# Patient Record
Sex: FEMALE | Race: BLACK OR AFRICAN AMERICAN | NOT HISPANIC OR LATINO
[De-identification: names, ages, dates, MRNs, and addresses within clinical notes are randomized per-mention and may not be internally consistent; named-entity substitution may affect disease eponyms.]

---

## 2018-11-01 PROBLEM — Z00.00 ENCOUNTER FOR PREVENTIVE HEALTH EXAMINATION: Status: ACTIVE | Noted: 2018-11-01

## 2018-11-06 ENCOUNTER — FORM ENCOUNTER (OUTPATIENT)
Age: 53
End: 2018-11-06

## 2018-11-07 ENCOUNTER — OUTPATIENT (OUTPATIENT)
Dept: OUTPATIENT SERVICES | Facility: HOSPITAL | Age: 53
LOS: 1 days | End: 2018-11-07
Payer: COMMERCIAL

## 2018-11-07 ENCOUNTER — APPOINTMENT (OUTPATIENT)
Dept: ORTHOPEDIC SURGERY | Facility: CLINIC | Age: 53
End: 2018-11-07
Payer: COMMERCIAL

## 2018-11-07 VITALS
WEIGHT: 205 LBS | OXYGEN SATURATION: 98 % | HEIGHT: 68.5 IN | DIASTOLIC BLOOD PRESSURE: 76 MMHG | SYSTOLIC BLOOD PRESSURE: 120 MMHG | BODY MASS INDEX: 30.71 KG/M2 | HEART RATE: 80 BPM

## 2018-11-07 DIAGNOSIS — Z83.3 FAMILY HISTORY OF DIABETES MELLITUS: ICD-10-CM

## 2018-11-07 DIAGNOSIS — Z82.49 FAMILY HISTORY OF ISCHEMIC HEART DISEASE AND OTHER DISEASES OF THE CIRCULATORY SYSTEM: ICD-10-CM

## 2018-11-07 DIAGNOSIS — M17.12 UNILATERAL PRIMARY OSTEOARTHRITIS, LEFT KNEE: ICD-10-CM

## 2018-11-07 DIAGNOSIS — Z78.9 OTHER SPECIFIED HEALTH STATUS: ICD-10-CM

## 2018-11-07 PROCEDURE — 73562 X-RAY EXAM OF KNEE 3: CPT

## 2018-11-07 PROCEDURE — 99203 OFFICE O/P NEW LOW 30 MIN: CPT

## 2018-11-07 PROCEDURE — 73562 X-RAY EXAM OF KNEE 3: CPT | Mod: 26,50

## 2022-03-12 ENCOUNTER — NON-APPOINTMENT (OUTPATIENT)
Age: 57
End: 2022-03-12

## 2022-05-04 ENCOUNTER — NON-APPOINTMENT (OUTPATIENT)
Age: 57
End: 2022-05-04

## 2022-05-05 ENCOUNTER — APPOINTMENT (OUTPATIENT)
Dept: GYNECOLOGIC ONCOLOGY | Facility: CLINIC | Age: 57
End: 2022-05-05

## 2022-06-23 ENCOUNTER — NON-APPOINTMENT (OUTPATIENT)
Age: 57
End: 2022-06-23

## 2022-06-23 ENCOUNTER — APPOINTMENT (OUTPATIENT)
Dept: GYNECOLOGIC ONCOLOGY | Facility: CLINIC | Age: 57
End: 2022-06-23
Payer: COMMERCIAL

## 2022-06-23 VITALS
DIASTOLIC BLOOD PRESSURE: 85 MMHG | SYSTOLIC BLOOD PRESSURE: 128 MMHG | HEIGHT: 68.5 IN | BODY MASS INDEX: 35.36 KG/M2 | HEART RATE: 84 BPM | WEIGHT: 236 LBS | TEMPERATURE: 97.4 F | OXYGEN SATURATION: 99 %

## 2022-06-23 DIAGNOSIS — Z01.419 ENCOUNTER FOR GYNECOLOGICAL EXAMINATION (GENERAL) (ROUTINE) W/OUT ABNORMAL FINDINGS: ICD-10-CM

## 2022-06-23 PROCEDURE — 99386 PREV VISIT NEW AGE 40-64: CPT

## 2022-06-23 NOTE — CHIEF COMPLAINT
[FreeTextEntry1] : 57 y/o presents to establish new GYN care. Pt reports she has gained 50lbs due to the pandemic and ACL tear and total knee replacement but she is healed and ready to get back in the gym!\par \par Pt is on estrogel and is very happy on it. \par \par She is c/o yeast infection and prefers to do pap smear in 2 weeks. \par \par LMP:55 , denies any PMB \par OBHX:   x 3\par GYNHX: denies hx of ovarian cysts/fibroids or abnormal pap smears. \par \par PMHX: denies\par SX: total knee replacement and breast reduction 8 years ago\par \par MED: Denies\par ALL: NKDA\par \par SOCIAL: working as  in [employer], denies any toxic habits\par FAMHX: denies fmhx of cancer\par \par Health Maintenance\par Last pap: 2 years ago- normal as per patient \par Last mammogram: Dec 2019\par Last colonoscopy: 2021- repeat in 10 years\par

## 2022-06-23 NOTE — ASSESSMENT
[FreeTextEntry1] : -Patient doing well \par -Exam c/w Candida vaginitis extending to buttocks. Will prescribe diflucan and lotrimin\par -Mammogram referrla\par -Follow up in 2 weeks for pap smear

## 2022-06-24 ENCOUNTER — TRANSCRIPTION ENCOUNTER (OUTPATIENT)
Age: 57
End: 2022-06-24

## 2022-06-24 LAB
C TRACH RRNA SPEC QL NAA+PROBE: NOT DETECTED
N GONORRHOEA RRNA SPEC QL NAA+PROBE: NOT DETECTED
SOURCE AMPLIFICATION: NORMAL

## 2022-06-27 ENCOUNTER — TRANSCRIPTION ENCOUNTER (OUTPATIENT)
Age: 57
End: 2022-06-27

## 2022-07-20 ENCOUNTER — NON-APPOINTMENT (OUTPATIENT)
Age: 57
End: 2022-07-20

## 2022-07-21 ENCOUNTER — APPOINTMENT (OUTPATIENT)
Dept: GYNECOLOGIC ONCOLOGY | Facility: CLINIC | Age: 57
End: 2022-07-21

## 2022-07-21 VITALS
OXYGEN SATURATION: 98 % | WEIGHT: 236 LBS | DIASTOLIC BLOOD PRESSURE: 78 MMHG | RESPIRATION RATE: 18 BRPM | HEIGHT: 68.5 IN | BODY MASS INDEX: 35.36 KG/M2 | SYSTOLIC BLOOD PRESSURE: 131 MMHG | TEMPERATURE: 97.2 F | HEART RATE: 80 BPM

## 2022-07-21 DIAGNOSIS — Z98.890 OTHER SPECIFIED POSTPROCEDURAL STATES: ICD-10-CM

## 2022-07-21 DIAGNOSIS — Z12.4 ENCOUNTER FOR SCREENING FOR MALIGNANT NEOPLASM OF CERVIX: ICD-10-CM

## 2022-07-21 DIAGNOSIS — R10.2 PELVIC AND PERINEAL PAIN: ICD-10-CM

## 2022-07-21 PROCEDURE — 99396 PREV VISIT EST AGE 40-64: CPT

## 2022-07-21 NOTE — CHIEF COMPLAINT
[FreeTextEntry1] : 55 y/o presents to establish new GYN care. Pt reports she has gained 50lbs due to the pandemic and ACL tear and total knee replacement but she is healed and ready to get back in the gym!\par \par Pt is on estrogel and is very happy on it. \par \par She is c/o yeast infection and prefers to do pap smear in 2 weeks. \par \par LMP:55 , denies any PMB \par OBHX:   x 3\par GYNHX: denies hx of ovarian cysts/fibroids or abnormal pap smears. \par \par PMHX: denies\par SX: total knee replacement and breast reduction 8 years ago\par \par MED: Denies\par ALL: NKDA\par \par SOCIAL: working as  in [employer], denies any toxic habits\par FAMHX: denies fmhx of cancer\par \par Health Maintenance\par Last pap: 2 years ago- normal as per patient \par Last mammogram: Dec 2019\par Last colonoscopy: 2021- repeat in 10 years\par

## 2022-07-21 NOTE — PHYSICAL EXAM
[Present] : CVAT: present [Abnormal] : External genitalia: Abnormal [Normal] : Bimanual Exam: Normal [de-identified] : vulva c/w candida extending to buttocks [de-identified] : discharge c/w candida

## 2022-07-21 NOTE — REASON FOR VISIT
[FreeTextEntry1] : Pt returns today for pap smear. She reports yeast infection is still present despite using diflucan and lotrimin. Additionally, pt c/o 2 months of pelvic cramping similar to menses, but denies any PMPB\par \par Health Maintenance\par Mammogram- 7/2022- additional imaging needed

## 2022-07-22 LAB — HPV HIGH+LOW RISK DNA PNL CVX: NOT DETECTED

## 2022-07-27 ENCOUNTER — TRANSCRIPTION ENCOUNTER (OUTPATIENT)
Age: 57
End: 2022-07-27

## 2022-07-27 LAB — CYTOLOGY CVX/VAG DOC THIN PREP: NORMAL

## 2022-08-12 ENCOUNTER — NON-APPOINTMENT (OUTPATIENT)
Age: 57
End: 2022-08-12

## 2023-01-26 ENCOUNTER — APPOINTMENT (OUTPATIENT)
Dept: GYNECOLOGIC ONCOLOGY | Facility: CLINIC | Age: 58
End: 2023-01-26
Payer: COMMERCIAL

## 2023-01-26 ENCOUNTER — NON-APPOINTMENT (OUTPATIENT)
Age: 58
End: 2023-01-26

## 2023-01-26 VITALS
SYSTOLIC BLOOD PRESSURE: 137 MMHG | HEIGHT: 68.5 IN | OXYGEN SATURATION: 97 % | TEMPERATURE: 97.4 F | DIASTOLIC BLOOD PRESSURE: 95 MMHG | HEART RATE: 88 BPM

## 2023-01-26 DIAGNOSIS — R92.8 OTHER ABNORMAL AND INCONCLUSIVE FINDINGS ON DIAGNOSTIC IMAGING OF BREAST: ICD-10-CM

## 2023-01-26 PROCEDURE — 58100 BIOPSY OF UTERUS LINING: CPT

## 2023-01-26 PROCEDURE — 99396 PREV VISIT EST AGE 40-64: CPT | Mod: 25

## 2023-01-26 NOTE — PROCEDURE
[Endometrial Biopsy] : an endometrial biopsy [FreeTextEntry1] : pipelle introduced up to 6.5cm , 3 passes performed with minimal tissue

## 2023-01-26 NOTE — REASON FOR VISIT
[FreeTextEntry1] : Patient presents today c/o PMB x 1 week. She reports she hasn’t seen her menses in 3 years. \par \par Health Maintenance\par Mammogram- 7/2022- additional imaging needed

## 2023-01-26 NOTE — PHYSICAL EXAM
[Present] : CVAT: present [Abnormal] : External genitalia: Abnormal [Normal] : Bimanual Exam: Normal [de-identified] : vulva c/w candida extending to buttocks [de-identified] : discharge c/w candida

## 2023-01-26 NOTE — CHIEF COMPLAINT
[FreeTextEntry1] : 57 y/o presents to establish new GYN care. Pt reports she has gained 50lbs due to the pandemic and ACL tear and total knee replacement but she is healed and ready to get back in the gym!\par \par Pt is on estrogel and is very happy on it. \par \par She is c/o yeast infection and prefers to do pap smear in 2 weeks. \par \par LMP:55 , denies any PMB \par OBHX:   x 3\par GYNHX: denies hx of ovarian cysts/fibroids or abnormal pap smears. \par \par PMHX: denies\par SX: total knee replacement and breast reduction 8 years ago\par \par MED: Denies\par ALL: NKDA\par \par SOCIAL: working as  in [employer], denies any toxic habits\par FAMHX: denies fmhx of cancer\par \par Health Maintenance\par Last pap: 2 years ago- normal as per patient \par Last mammogram: Dec 2019\par Last colonoscopy: 2021- repeat in 10 years\par

## 2023-01-27 DIAGNOSIS — B37.31 ACUTE CANDIDIASIS OF VULVA AND VAGINA: ICD-10-CM

## 2023-02-17 ENCOUNTER — APPOINTMENT (OUTPATIENT)
Dept: GYNECOLOGIC ONCOLOGY | Facility: CLINIC | Age: 58
End: 2023-02-17

## 2023-02-17 ENCOUNTER — APPOINTMENT (OUTPATIENT)
Dept: GYNECOLOGIC ONCOLOGY | Facility: CLINIC | Age: 58
End: 2023-02-17
Payer: COMMERCIAL

## 2023-02-17 VITALS
TEMPERATURE: 97.6 F | SYSTOLIC BLOOD PRESSURE: 146 MMHG | BODY MASS INDEX: 32.96 KG/M2 | HEIGHT: 68.5 IN | DIASTOLIC BLOOD PRESSURE: 75 MMHG | OXYGEN SATURATION: 99 % | HEART RATE: 85 BPM | WEIGHT: 220 LBS | RESPIRATION RATE: 18 BRPM

## 2023-02-17 DIAGNOSIS — D25.9 LEIOMYOMA OF UTERUS, UNSPECIFIED: ICD-10-CM

## 2023-02-17 PROCEDURE — 99215 OFFICE O/P EST HI 40 MIN: CPT

## 2023-02-17 NOTE — HISTORY OF PRESENT ILLNESS
[FreeTextEntry1] : Problem List\par 1. PMB\par \par Previous Therapy\par 1. Pelvic US 1/2023\par     a) endometrium 0.4cm\par

## 2023-02-17 NOTE — CHIEF COMPLAINT
[FreeTextEntry1] : 57 y/o presents to establish new GYN care. Pt reports she has gained 50lbs due to the pandemic and ACL tear and total knee replacement but she is healed and ready to get back in the gym!\par \par Pt is on estrogel and is very happy on it. \par \par She is c/o yeast infection and prefers to do pap smear in 2 weeks. \par \par LMP:55 , denies any PMB \par OBHX:   x 3\par GYNHX: denies hx of ovarian cysts/fibroids or abnormal pap smears. \par \par PMHX: denies\par SX: total knee replacement and breast reduction 8 years ago\par \par MED: Denies\par ALL: NKDA\par \par SOCIAL: working as  in [employer], denies any toxic habits\par FAMHX: denies fmhx of cancer\par \par Health Maintenance\par Last pap: 2 years ago- normal as per patient \par Last mammogram: Dec 2019\par Last colonoscopy: 2021- repeat in 10 years\par

## 2023-02-17 NOTE — PHYSICAL EXAM
[Present] : CVAT: present [Normal] : Bimanual Exam: Normal [Fully active, able to carry on all pre-disease performance without restriction] : Status 0 - Fully active, able to carry on all pre-disease performance without restriction [de-identified] : vulva c/w candida extending to buttocks now improving

## 2023-02-17 NOTE — PROCEDURE
[Endometrial Biopsy] : an endometrial biopsy [Verbal Consent] : verbal consent was obtained prior to the procedure and is detailed in the patient's record [FreeTextEntry1] : pipelle introduced up to 6.5cm , 3 passes performed with minimal tissue

## 2023-02-17 NOTE — REASON FOR VISIT
[FreeTextEntry1] : Patient presents today to repeat EMB. Additionally she states she had another episode of PMB. \par \par LMP:55 , denies any PMB \par OBHX:  x 3\par GYNHX: denies hx of ovarian cysts/fibroids or abnormal pap smears. \par \par PMHX: denies\par SX: total knee replacement and breast reduction 8 years ago\par \par MED: Denies\par ALL: NKDA\par \par SOCIAL: working as  in [employer] denies any toxic habits\par FAMHX: denies fmhx of cancer\par \par Health Maintenance\par Mammogram-2023- 6 month follow up needed- 2023 patient is aware\par Last pap- 2022- negative, HPV negative \par Last colonoscopy - 2021- repeat in 10 years

## 2023-02-17 NOTE — ASSESSMENT
[FreeTextEntry1] : I discussed with the patient with the aid of diagrams, reviewed the findings on history and physical examination, and reviewed the imaging studies in detail. She has PMB with a thin endometrial lining.\par \par Benign, pre-malignant (such as atypical hyperplasia) and malignant causes of these findings discussed. The risk of endometrial cancer in cases of a thin endometrial stripe is less than 1%. Patient shared that she would prefer more thorough evaluation and go forward with D&C. \par \par Complications that include, but are not limited to: bleeding, infection and uterine perforation discussed. In the case of uterine perforation, diagnostic laparoscopy may be indicated. Cervical stenosis and possible inability to enter the uterine cavity was also discussed. I have also provided her with the diagrams. \par \par Surgical scheduling was discussed and instructions for optimization prior to surgery were given. NPO after midnight.  No aspirin or NSAID products for 1 week prior. Instructions for misoprostol 48 hours prior to surgery given.  A copy of the above diagrams was given to the patient. \par \par [] Medical clearance\par [] COVID test pre-op\par [] No Miso- patient has allergy\par [] D&C, hysteroscopy @ [facility] in March

## 2023-02-21 ENCOUNTER — NON-APPOINTMENT (OUTPATIENT)
Age: 58
End: 2023-02-21

## 2023-02-21 LAB
CORE LAB BIOPSY: NORMAL
FSH SERPL-MCNC: 68.5 IU/L

## 2023-03-05 ENCOUNTER — RESULT CHARGE (OUTPATIENT)
Age: 58
End: 2023-03-05

## 2023-03-06 ENCOUNTER — APPOINTMENT (OUTPATIENT)
Dept: INTERNAL MEDICINE | Facility: CLINIC | Age: 58
End: 2023-03-06
Payer: COMMERCIAL

## 2023-03-06 VITALS
DIASTOLIC BLOOD PRESSURE: 82 MMHG | HEART RATE: 108 BPM | OXYGEN SATURATION: 100 % | WEIGHT: 236 LBS | TEMPERATURE: 98 F | SYSTOLIC BLOOD PRESSURE: 121 MMHG | HEIGHT: 68.5 IN | BODY MASS INDEX: 35.36 KG/M2

## 2023-03-06 DIAGNOSIS — N95.0 POSTMENOPAUSAL BLEEDING: ICD-10-CM

## 2023-03-06 DIAGNOSIS — Z01.818 ENCOUNTER FOR OTHER PREPROCEDURAL EXAMINATION: ICD-10-CM

## 2023-03-06 PROCEDURE — 93000 ELECTROCARDIOGRAM COMPLETE: CPT

## 2023-03-06 PROCEDURE — 99214 OFFICE O/P EST MOD 30 MIN: CPT | Mod: 25,GC

## 2023-03-06 PROCEDURE — 36415 COLL VENOUS BLD VENIPUNCTURE: CPT

## 2023-03-07 LAB
ALBUMIN SERPL ELPH-MCNC: 4.4 G/DL
ALP BLD-CCNC: 65 U/L
ALT SERPL-CCNC: 15 U/L
ANION GAP SERPL CALC-SCNC: 14 MMOL/L
APTT BLD: 29.6 SEC
AST SERPL-CCNC: 19 U/L
BASOPHILS # BLD AUTO: 0.03 K/UL
BASOPHILS NFR BLD AUTO: 0.6 %
BILIRUB SERPL-MCNC: 0.3 MG/DL
BUN SERPL-MCNC: 13 MG/DL
CALCIUM SERPL-MCNC: 9.8 MG/DL
CHLORIDE SERPL-SCNC: 101 MMOL/L
CO2 SERPL-SCNC: 24 MMOL/L
CREAT SERPL-MCNC: 0.63 MG/DL
EGFR: 105 ML/MIN/1.73M2
EOSINOPHIL # BLD AUTO: 0.07 K/UL
EOSINOPHIL NFR BLD AUTO: 1.3 %
ESTIMATED AVERAGE GLUCOSE: 123 MG/DL
GLUCOSE SERPL-MCNC: 97 MG/DL
HBA1C MFR BLD HPLC: 5.9 %
HCT VFR BLD CALC: 40.2 %
HGB BLD-MCNC: 12 G/DL
IMM GRANULOCYTES NFR BLD AUTO: 0.2 %
INR PPP: 1.02 RATIO
LYMPHOCYTES # BLD AUTO: 1.8 K/UL
LYMPHOCYTES NFR BLD AUTO: 33.1 %
MAN DIFF?: NORMAL
MCHC RBC-ENTMCNC: 27.7 PG
MCHC RBC-ENTMCNC: 29.9 GM/DL
MCV RBC AUTO: 92.8 FL
MONOCYTES # BLD AUTO: 0.64 K/UL
MONOCYTES NFR BLD AUTO: 11.8 %
NEUTROPHILS # BLD AUTO: 2.89 K/UL
NEUTROPHILS NFR BLD AUTO: 53 %
PLATELET # BLD AUTO: 188 K/UL
POTASSIUM SERPL-SCNC: 4.3 MMOL/L
PROT SERPL-MCNC: 7.1 G/DL
PT BLD: 11.9 SEC
RBC # BLD: 4.33 M/UL
RBC # FLD: 13.2 %
SODIUM SERPL-SCNC: 139 MMOL/L
WBC # FLD AUTO: 5.44 K/UL

## 2023-03-17 NOTE — ASU PATIENT PROFILE, ADULT - FALL HARM RISK - RISK INTERVENTIONS

## 2023-03-17 NOTE — ASU PATIENT PROFILE, ADULT - ABLE TO REACH PT
Left voicemail instruction and time. Patient instructed to arrive at 09:00am. No solid food/dairy/candy/gum after midnight Sunday, water before 08:00am Monday; Patient to come with photo ID/insurance card; dress in comfortable clothes; no jewelries/valuables/contact lens; no smoking/alcohol drinking/recreational drug use Sunday;  escort must have photo ID, address and call back number given./no

## 2023-03-17 NOTE — ASU PATIENT PROFILE, ADULT - NSICDXPASTSURGICALHX_GEN_ALL_CORE_FT
PAST SURGICAL HISTORY:  History of mastopexy      PAST SURGICAL HISTORY:  History of mastopexy     S/P total knee replacement, right

## 2023-03-19 ENCOUNTER — TRANSCRIPTION ENCOUNTER (OUTPATIENT)
Age: 58
End: 2023-03-19

## 2023-03-20 ENCOUNTER — OUTPATIENT (OUTPATIENT)
Dept: OUTPATIENT SERVICES | Facility: HOSPITAL | Age: 58
LOS: 1 days | Discharge: HOME | End: 2023-03-20
Payer: COMMERCIAL

## 2023-03-20 ENCOUNTER — RESULT REVIEW (OUTPATIENT)
Age: 58
End: 2023-03-20

## 2023-03-20 ENCOUNTER — TRANSCRIPTION ENCOUNTER (OUTPATIENT)
Age: 58
End: 2023-03-20

## 2023-03-20 ENCOUNTER — APPOINTMENT (OUTPATIENT)
Dept: GYNECOLOGIC ONCOLOGY | Facility: AMBULATORY SURGERY CENTER | Age: 58
End: 2023-03-20

## 2023-03-20 VITALS
SYSTOLIC BLOOD PRESSURE: 131 MMHG | DIASTOLIC BLOOD PRESSURE: 61 MMHG | OXYGEN SATURATION: 100 % | HEART RATE: 72 BPM | RESPIRATION RATE: 15 BRPM

## 2023-03-20 VITALS
SYSTOLIC BLOOD PRESSURE: 117 MMHG | TEMPERATURE: 98 F | HEIGHT: 68 IN | DIASTOLIC BLOOD PRESSURE: 72 MMHG | OXYGEN SATURATION: 99 % | HEART RATE: 109 BPM | WEIGHT: 231.71 LBS | RESPIRATION RATE: 16 BRPM

## 2023-03-20 DIAGNOSIS — Z98.890 OTHER SPECIFIED POSTPROCEDURAL STATES: Chronic | ICD-10-CM

## 2023-03-20 DIAGNOSIS — Z96.651 PRESENCE OF RIGHT ARTIFICIAL KNEE JOINT: Chronic | ICD-10-CM

## 2023-03-20 PROCEDURE — 88305 TISSUE EXAM BY PATHOLOGIST: CPT | Mod: 26

## 2023-03-20 PROCEDURE — 58558 HYSTEROSCOPY BIOPSY: CPT

## 2023-03-20 RX ORDER — ACETAMINOPHEN 500 MG
1000 TABLET ORAL ONCE
Refills: 0 | Status: COMPLETED | OUTPATIENT
Start: 2023-03-20 | End: 2023-03-20

## 2023-03-20 RX ORDER — CETIRIZINE HYDROCHLORIDE 10 MG/1
1 TABLET ORAL
Qty: 0 | Refills: 0 | DISCHARGE

## 2023-03-20 RX ORDER — SODIUM CHLORIDE 9 MG/ML
500 INJECTION, SOLUTION INTRAVENOUS
Refills: 0 | Status: DISCONTINUED | OUTPATIENT
Start: 2023-03-20 | End: 2023-03-20

## 2023-03-20 RX ORDER — OXYCODONE HYDROCHLORIDE 5 MG/1
5 TABLET ORAL ONCE
Refills: 0 | Status: DISCONTINUED | OUTPATIENT
Start: 2023-03-20 | End: 2023-03-20

## 2023-03-20 RX ORDER — FENTANYL CITRATE 50 UG/ML
25 INJECTION INTRAVENOUS
Refills: 0 | Status: DISCONTINUED | OUTPATIENT
Start: 2023-03-20 | End: 2023-03-20

## 2023-03-20 RX ADMIN — FENTANYL CITRATE 25 MICROGRAM(S): 50 INJECTION INTRAVENOUS at 13:25

## 2023-03-20 RX ADMIN — Medication 500 MILLIGRAM(S): at 09:55

## 2023-03-20 RX ADMIN — FENTANYL CITRATE 25 MICROGRAM(S): 50 INJECTION INTRAVENOUS at 13:05

## 2023-03-20 RX ADMIN — FENTANYL CITRATE 25 MICROGRAM(S): 50 INJECTION INTRAVENOUS at 13:10

## 2023-03-20 NOTE — ASU PREOP CHECKLIST - 2.
patient has 1 white stud earring right ear, refused to be removed, risks explained.  Kourtney OR RN informed.

## 2023-03-21 LAB — SURGICAL PATHOLOGY STUDY: SIGNIFICANT CHANGE UP

## 2023-03-22 ENCOUNTER — NON-APPOINTMENT (OUTPATIENT)
Age: 58
End: 2023-03-22

## 2023-04-14 PROBLEM — R10.2 PELVIC AND PERINEAL PAIN: Chronic | Status: ACTIVE | Noted: 2023-03-17

## 2023-05-03 NOTE — HISTORY OF PRESENT ILLNESS
[FreeTextEntry1] : Problem List\par 1. PMB\par \par Previous Therapy\par 1. Pelvic US 1/2023\par     a) endometrium 0.4cm\par 2. Dilation  and curettage, hysteroscopy 3/20/23\par     a) Endometrium, curettage:\par - Strips of inactive endometrium\par - Unremarkable endocervical and squamous epithelium\par

## 2023-05-05 ENCOUNTER — APPOINTMENT (OUTPATIENT)
Dept: GYNECOLOGIC ONCOLOGY | Facility: CLINIC | Age: 58
End: 2023-05-05

## 2023-06-19 ENCOUNTER — APPOINTMENT (OUTPATIENT)
Dept: INTERNAL MEDICINE | Facility: CLINIC | Age: 58
End: 2023-06-19

## 2023-07-06 ENCOUNTER — APPOINTMENT (OUTPATIENT)
Dept: INTERNAL MEDICINE | Facility: CLINIC | Age: 58
End: 2023-07-06
Payer: COMMERCIAL

## 2023-07-06 VITALS
HEART RATE: 87 BPM | HEIGHT: 68.5 IN | DIASTOLIC BLOOD PRESSURE: 75 MMHG | OXYGEN SATURATION: 99 % | BODY MASS INDEX: 35.36 KG/M2 | WEIGHT: 236 LBS | SYSTOLIC BLOOD PRESSURE: 109 MMHG | TEMPERATURE: 97 F

## 2023-07-06 PROCEDURE — 99213 OFFICE O/P EST LOW 20 MIN: CPT | Mod: GC,25

## 2023-07-06 PROCEDURE — 99396 PREV VISIT EST AGE 40-64: CPT | Mod: 25

## 2023-07-06 PROCEDURE — 99203 OFFICE O/P NEW LOW 30 MIN: CPT | Mod: GC,25

## 2023-07-06 PROCEDURE — 99386 PREV VISIT NEW AGE 40-64: CPT | Mod: 25

## 2023-07-06 PROCEDURE — 36415 COLL VENOUS BLD VENIPUNCTURE: CPT

## 2023-07-06 RX ORDER — CLOTRIMAZOLE 10 MG/G
1 CREAM TOPICAL 3 TIMES DAILY
Qty: 1 | Refills: 1 | Status: DISCONTINUED | COMMUNITY
Start: 2023-01-27 | End: 2023-07-06

## 2023-07-06 RX ORDER — BUTENAFINE HCL 1 %
1 CREAM (GRAM) TOPICAL DAILY
Qty: 1 | Refills: 1 | Status: DISCONTINUED | COMMUNITY
Start: 2022-07-21 | End: 2023-07-06

## 2023-07-06 RX ORDER — FLUCONAZOLE 150 MG/1
150 TABLET ORAL
Qty: 2 | Refills: 0 | Status: DISCONTINUED | COMMUNITY
Start: 2022-07-21 | End: 2023-07-06

## 2023-07-06 RX ORDER — CLOTRIMAZOLE 10 MG/G
1 CREAM TOPICAL 3 TIMES DAILY
Qty: 1 | Refills: 1 | Status: DISCONTINUED | COMMUNITY
Start: 2022-06-23 | End: 2023-07-06

## 2023-07-06 RX ORDER — FLUCONAZOLE 150 MG/1
150 TABLET ORAL
Qty: 2 | Refills: 0 | Status: DISCONTINUED | COMMUNITY
Start: 2022-06-23 | End: 2023-07-06

## 2023-07-07 ENCOUNTER — NON-APPOINTMENT (OUTPATIENT)
Age: 58
End: 2023-07-07

## 2023-07-07 ENCOUNTER — TRANSCRIPTION ENCOUNTER (OUTPATIENT)
Age: 58
End: 2023-07-07

## 2023-07-10 LAB
CHOLEST SERPL-MCNC: 261 MG/DL
HDLC SERPL-MCNC: 63 MG/DL
LDLC SERPL CALC-MCNC: 177 MG/DL
NONHDLC SERPL-MCNC: 198 MG/DL
TRIGL SERPL-MCNC: 108 MG/DL

## 2023-07-10 NOTE — PLAN
[FreeTextEntry1] : #Migraines with aura\par Patient with hx of migraines since she was a teenager. Previously on botox with resolution of migraines while living in [place]. Now with monthly migraines with associated N/V, light sensitivity. \par - patient to see own neurologist, will provide referral if needed\par - continue with excedrin and coffee if needed\par - can consider sumatriptan if cardiology exam shows no evidence of CAD \par - consider neuro referral in the future\par \par #Possible TIA\par #Afib\par Patient with history of pAFib (not on medications) and without hx of known CAD.\par - cardiology referral\par - will consider starting eliquis if indicated\par - lipid panel\par \par #Depression\par Patient reports feeling depressed with low motivation. States she has tried Zoloft in the past but could not tolerate side effects. Had some success with talk therapy.\par - referral to therapist\par - consider bupropion in future if no improvement with therapy\par \par #Prediabetes\par Patient with A1c 5.9% at last visit. Attributes it to gaining weight iso being depressed and not motivated.\par - therapy as above\par - encouraged patient to make lifestyle modifications with diet and exercise, cut down on alcohol use\par - recheck A1c at next visit\par \par #HCM\par - Mammogram scheduled for this week\par - Colonoscopy normal in 2021, next due in 2031\par - Pap smear with HPV normal in 2022, next due in 2027\par - Hysteroscopy and D&C performed in Mar 2023 for AUB, pathology normal\par \par RTC in 3 months for follow up

## 2023-07-10 NOTE — END OF VISIT
[] : Resident [FreeTextEntry3] :  57 year old F presenting to establish care. Reports hx of migraines, [place] Now uses NSAIDs/caffeine for rescue treatment. Reports episode years ago of sudden vision loss in R eye while exercising at the gym. Reports "a curtain" passing across her vision, symptoms resolved within a few minutes and she completed her work out, did not seek medical attention at the time. however, she subsequently had MRI brain with PCP which was reportedly normal, will try to get us those records. Has h/o pAF, not on AC. Well appearing on exam. Although pt's isolated episode of vision loss was attributed to ocular migraine at the time, it had features concerning for amaurosis fugax. Will try to obtain MRI brain report and MRI/MRA neck if performed. Defer triptan for now given ?risk of CVA. Referred to neurology.

## 2023-07-10 NOTE — HISTORY OF PRESENT ILLNESS
[FreeTextEntry1] : establish care [de-identified] : Patient is a 58yo F with PMH of paroxysmal AFib, possible TIA (amourosis fugax) 3 years ago, and migraines with aura who presents to establish care. \par \par Patient reports feeling well overall. Has been struggling with weight gain recently which she thinks is due to lack of motivation to workout and also due to bad eating habits. Additionally, she has been drinking 2-3 glasses of wine every night and feels she may be depressed. She has previously tried Zoloft but did not tolerate due to side effects, did have some improvement in symptoms with therapy and is willing to try again. \par \par States she had an episode about 3 years ago where she had transient vision loss of the right eye. Episode occurred while she was walking on the treadmill and she felt like a "curtain pulled over and then pulled back". Does have a history of arrhythmia, was told she likely has paroxysmal AFib as she regularly has palpitations that self-resolve. Was placed on a Holter monitor in [place] but no events recorded at the time. Has never taken anticoagulation, was taking Aspirin but recently stopped due to easy bruising. No hx of cardiovascular disease. Patient believes episode at the time was thought to be 2/2 ocular migraine. Denies any further episodes of vision loss. \par \par Patient with hx of migraines with aura. States she started getting migraines initially with onset of menses as a teenager, and was being treated with Botox when she lived in [facility]. Recently, since hs moved back, she has been treating migraines with excedrin, coffee, and rest. Reports getting migraines once a month with associated nausea, vomiting, light sensitivity. \par \par HCM - Mammogram scheduled for this Saturday. Pap smear 7/2022 (HPV negative). Colonoscopy 12/2021 --> due for repeat in 2031.\par \par \par \par \par \par

## 2023-08-02 ENCOUNTER — NON-APPOINTMENT (OUTPATIENT)
Age: 58
End: 2023-08-02

## 2023-08-02 ENCOUNTER — APPOINTMENT (OUTPATIENT)
Dept: HEART AND VASCULAR | Facility: CLINIC | Age: 58
End: 2023-08-02

## 2023-08-02 VITALS
HEART RATE: 84 BPM | BODY MASS INDEX: 35.51 KG/M2 | HEIGHT: 68.5 IN | SYSTOLIC BLOOD PRESSURE: 118 MMHG | OXYGEN SATURATION: 97 % | DIASTOLIC BLOOD PRESSURE: 86 MMHG | WEIGHT: 237 LBS | TEMPERATURE: 97.9 F | RESPIRATION RATE: 16 BRPM

## 2023-08-29 ENCOUNTER — APPOINTMENT (OUTPATIENT)
Dept: HEART AND VASCULAR | Facility: CLINIC | Age: 58
End: 2023-08-29
Payer: COMMERCIAL

## 2023-08-29 ENCOUNTER — NON-APPOINTMENT (OUTPATIENT)
Age: 58
End: 2023-08-29

## 2023-08-29 VITALS
SYSTOLIC BLOOD PRESSURE: 116 MMHG | RESPIRATION RATE: 16 BRPM | WEIGHT: 238 LBS | OXYGEN SATURATION: 98 % | HEIGHT: 68.5 IN | DIASTOLIC BLOOD PRESSURE: 79 MMHG | HEART RATE: 84 BPM | BODY MASS INDEX: 35.66 KG/M2

## 2023-08-29 PROCEDURE — 99215 OFFICE O/P EST HI 40 MIN: CPT | Mod: 25

## 2023-08-29 PROCEDURE — 93000 ELECTROCARDIOGRAM COMPLETE: CPT

## 2023-08-29 NOTE — REVIEW OF SYSTEMS
[SOB] : shortness of breath [Chest Discomfort] : no chest discomfort [Lower Ext Edema] : no extremity edema [Leg Claudication] : no intermittent leg claudication [Palpitations] : palpitations [Orthopnea] : no orthopnea [PND] : no PND [Syncope] : no syncope [Negative] : Heme/Lymph

## 2023-08-29 NOTE — CARDIOLOGY SUMMARY
[de-identified] : 8/29/2023: Atrial rhythm at 90bpm. NSSTTC 8/2/2023: NSR at 72bpm, NSSTTC (TWI V1-2)

## 2023-08-29 NOTE — ASSESSMENT
[FreeTextEntry1] : 57 year old woman, w/ a PMHx of paroxysmal AFib (not on AC), TIA (amaurosis fugax, 2020), HLD, and migraine HAs, presenting for f/u of AFib.   pAFib: [id number] 3, therefore stroke risk 3.2% per year ([place] Atrial Fibrillation Cohort Study) and 4.6% risk of stroke/TIA/systemic embolism. Patient states that she feels palpitations, described as an "irregular heartbeat" that is a/w SOB and nausea occurring at least once per week. These episodes last ~10 minutes and have occurred for ~1 year. She sometimes also experiences transient L hand paresthesia ("tingling") along w/ this. Patient admits that these episodes are exacerbated by times of emotional stress. Presently, she denies CP, SOB/TOWNSEND, dizziness/LOC, PND, orthopnea, HAs, abdominal pain, and LE swelling. ECG w/o AFib and physical exam stable. - start apixaban 5mg PO BID - monitor for signs and sx of bleeding - MCTx7 days ordered today given palpitations to ensure patient is feeling pAFib and not other arrhythmia - TTE ordered today  TIA: Patient w/ hx of amaurosis fugax several years ago and again w/in the last month. Requesting Neurology referral at this time. She reports no residual deficits and neurologic exam grossly WNL. - Neurology referral made today - patient instructed to monitor signs and sx of CVA and seek emergent medical attention if they present  HLD: Lipid profile 7/7/2023 w/ , , HDL 63, . - as per d/w PMD, patient to institute lifestyle modifications (diet and exercise) prior to possibly initiating statin tx - increase aerobic exercise as tolerated to aim for approximately 30 minutes of activity 5 days a week - heart healthy diet low in sodium, sugars and saturated fats and high in fruits, vegetables and whole grains  Prediabetes: HgA1c 5.9% in 3/2023. Attributes it to gaining weight iso being depressed and not motivated. - lifestyle modifications (diet and exercise)  - weight loss counseling - increase aerobic exercise as tolerated to aim for approximately 30 minutes of activity 5 days a week - heart healthy diet low in sodium, sugars and saturated fats and high in fruits, vegetables and whole grains  Obesity: BMI >35. - lifestyle modifications (diet and exercise)  - weight loss counseling - increase aerobic exercise as tolerated to aim for approximately 30 minutes of activity 5 days a week - heart healthy diet low in sodium, sugars and saturated fats and high in fruits, vegetables and whole grains  F/u in 1 month (s/p TTE, MCT, and Neurology appointment).

## 2023-08-29 NOTE — PHYSICAL EXAM
[No Acute Distress] : no acute distress [Normal Conjunctiva] : normal conjunctiva [Normal Venous Pressure] : normal venous pressure [No Carotid Bruit] : no carotid bruit [Normal S1, S2] : normal S1, S2 [No Murmur] : no murmur [No Rub] : no rub [No Gallop] : no gallop [Clear Lung Fields] : clear lung fields [Good Air Entry] : good air entry [No Respiratory Distress] : no respiratory distress  [Soft] : abdomen soft [Non Tender] : non-tender [No Masses/organomegaly] : no masses/organomegaly [Normal Bowel Sounds] : normal bowel sounds [Normal Gait] : normal gait [No Edema] : no edema [No Cyanosis] : no cyanosis [No Clubbing] : no clubbing [No Varicosities] : no varicosities [No Rash] : no rash [No Skin Lesions] : no skin lesions [Moves all extremities] : moves all extremities [No Focal Deficits] : no focal deficits [Normal Speech] : normal speech [Alert and Oriented] : alert and oriented [Normal memory] : normal memory [de-identified] : Obese [de-identified] : RRR

## 2023-08-29 NOTE — HISTORY OF PRESENT ILLNESS
[FreeTextEntry1] : 57 year old woman, w/ a PMHx of paroxysmal AFib (not on AC), TIA (amaurosis fugax, 2020), HLD, and migraine HAs, presenting for f/u of AFib. Patient states that she feels palpitations, described as an "irregular heartbeat" that is a/w SOB and nausea occurring at least once per week. These episodes last ~10 minutes and have occurred for ~1 year. She sometimes also experiences transient L hand paresthesia ("tingling") along w/ this. Patient admits that these episodes are exacerbated by times of emotional stress. Presently, she denies CP, SOB/TOWNSEND, dizziness/LOC, PND, orthopnea, HAs, abdominal pain, and LE swelling.

## 2023-09-25 ENCOUNTER — APPOINTMENT (OUTPATIENT)
Dept: HEART AND VASCULAR | Facility: CLINIC | Age: 58
End: 2023-09-25
Payer: COMMERCIAL

## 2023-09-25 PROCEDURE — 93306 TTE W/DOPPLER COMPLETE: CPT

## 2023-09-26 ENCOUNTER — APPOINTMENT (OUTPATIENT)
Dept: HEART AND VASCULAR | Facility: CLINIC | Age: 58
End: 2023-09-26
Payer: COMMERCIAL

## 2023-09-26 VITALS
SYSTOLIC BLOOD PRESSURE: 128 MMHG | OXYGEN SATURATION: 98 % | BODY MASS INDEX: 35.66 KG/M2 | DIASTOLIC BLOOD PRESSURE: 84 MMHG | HEIGHT: 68.5 IN | HEART RATE: 80 BPM | RESPIRATION RATE: 16 BRPM | WEIGHT: 238 LBS

## 2023-09-26 DIAGNOSIS — Z86.73 PERSONAL HISTORY OF TRANSIENT ISCHEMIC ATTACK (TIA), AND CEREBRAL INFARCTION W/OUT RESIDUAL DEFICITS: ICD-10-CM

## 2023-09-26 DIAGNOSIS — I48.91 UNSPECIFIED ATRIAL FIBRILLATION: ICD-10-CM

## 2023-09-26 DIAGNOSIS — E66.9 OBESITY, UNSPECIFIED: ICD-10-CM

## 2023-09-26 DIAGNOSIS — G45.3 AMAUROSIS FUGAX: ICD-10-CM

## 2023-09-26 DIAGNOSIS — E78.5 HYPERLIPIDEMIA, UNSPECIFIED: ICD-10-CM

## 2023-09-26 PROCEDURE — 99215 OFFICE O/P EST HI 40 MIN: CPT

## 2023-09-26 RX ORDER — ATORVASTATIN CALCIUM 40 MG/1
40 TABLET, FILM COATED ORAL
Qty: 90 | Refills: 3 | Status: ACTIVE | COMMUNITY
Start: 2023-09-26 | End: 1900-01-01

## 2023-10-24 ENCOUNTER — APPOINTMENT (OUTPATIENT)
Dept: INTERNAL MEDICINE | Facility: CLINIC | Age: 58
End: 2023-10-24

## 2023-11-07 NOTE — HISTORY OF PRESENT ILLNESS
[FreeTextEntry1] : Problem List\par 1. PMB\par \par Previous Therapy\par 1. Pelvic US 1/2023\par     a) endometrium 0.4cm\par  Nasalis-Muscle-Based Myocutaneous Island Pedicle Flap Text: Using a #15 blade, an incision was made around the donor flap to the level of the nasalis muscle. Wide lateral undermining was then performed in both the subcutaneous plane above the nasalis muscle, and in a submuscular plane just above periosteum. This allowed the formation of a free nasalis muscle axial pedicle (based on the angular artery) which was still attached to the actual cutaneous flap, increasing its mobility and vascular viability. Hemostasis was obtained with pinpoint electrocoagulation. The flap was mobilized into position and the pivotal anchor points positioned and stabilized with buried interrupted sutures. Subcutaneous and dermal tissues were closed in a multilayered fashion with sutures. Tissue redundancies were excised, and the epidermal edges were apposed without significant tension and sutured with sutures.

## 2023-11-08 ENCOUNTER — APPOINTMENT (OUTPATIENT)
Dept: HEART AND VASCULAR | Facility: CLINIC | Age: 58
End: 2023-11-08
Payer: COMMERCIAL

## 2023-11-08 ENCOUNTER — NON-APPOINTMENT (OUTPATIENT)
Age: 58
End: 2023-11-08

## 2023-11-08 VITALS — HEIGHT: 68.5 IN | TEMPERATURE: 97.6 F

## 2023-11-08 VITALS — SYSTOLIC BLOOD PRESSURE: 124 MMHG | HEART RATE: 84 BPM | DIASTOLIC BLOOD PRESSURE: 66 MMHG

## 2023-11-08 DIAGNOSIS — I48.0 PAROXYSMAL ATRIAL FIBRILLATION: ICD-10-CM

## 2023-11-08 PROCEDURE — 99204 OFFICE O/P NEW MOD 45 MIN: CPT | Mod: 25

## 2023-11-08 PROCEDURE — 93000 ELECTROCARDIOGRAM COMPLETE: CPT

## 2023-11-10 PROBLEM — I48.0 PAROXYSMAL ATRIAL FIBRILLATION: Status: ACTIVE | Noted: 2023-08-29

## 2023-12-02 RX ORDER — APIXABAN 5 MG/1
5 TABLET, FILM COATED ORAL
Qty: 90 | Refills: 3 | Status: ACTIVE | COMMUNITY
Start: 2023-08-29 | End: 1900-01-01

## 2023-12-12 ENCOUNTER — APPOINTMENT (OUTPATIENT)
Dept: INTERNAL MEDICINE | Facility: CLINIC | Age: 58
End: 2023-12-12
Payer: COMMERCIAL

## 2023-12-12 VITALS
BODY MASS INDEX: 35.96 KG/M2 | DIASTOLIC BLOOD PRESSURE: 82 MMHG | RESPIRATION RATE: 16 BRPM | HEIGHT: 68.5 IN | SYSTOLIC BLOOD PRESSURE: 133 MMHG | WEIGHT: 240 LBS | OXYGEN SATURATION: 98 % | HEART RATE: 85 BPM

## 2023-12-12 DIAGNOSIS — Z12.39 ENCOUNTER FOR OTHER SCREENING FOR MALIGNANT NEOPLASM OF BREAST: ICD-10-CM

## 2023-12-12 DIAGNOSIS — G43.909 MIGRAINE, UNSPECIFIED, NOT INTRACTABLE, W/OUT STATUS MIGRAINOSUS: ICD-10-CM

## 2023-12-12 DIAGNOSIS — R00.2 PALPITATIONS: ICD-10-CM

## 2023-12-12 DIAGNOSIS — F32.A DEPRESSION, UNSPECIFIED: ICD-10-CM

## 2023-12-12 PROCEDURE — 99215 OFFICE O/P EST HI 40 MIN: CPT | Mod: 25

## 2023-12-12 PROCEDURE — 36415 COLL VENOUS BLD VENIPUNCTURE: CPT

## 2023-12-14 LAB
ESTIMATED AVERAGE GLUCOSE: 123 MG/DL
HBA1C MFR BLD HPLC: 5.9 %

## 2023-12-15 ENCOUNTER — TRANSCRIPTION ENCOUNTER (OUTPATIENT)
Age: 58
End: 2023-12-15

## 2023-12-19 ENCOUNTER — APPOINTMENT (OUTPATIENT)
Dept: HEART AND VASCULAR | Facility: CLINIC | Age: 58
End: 2023-12-19

## 2023-12-19 RX ORDER — METFORMIN HYDROCHLORIDE 500 MG/1
500 TABLET, COATED ORAL DAILY
Qty: 30 | Refills: 1 | Status: ACTIVE | COMMUNITY
Start: 2023-12-15 | End: 1900-01-01

## 2023-12-31 ENCOUNTER — APPOINTMENT (OUTPATIENT)
Dept: HEART AND VASCULAR | Facility: CLINIC | Age: 58
End: 2023-12-31
Payer: COMMERCIAL

## 2023-12-31 PROCEDURE — 93272 ECG/REVIEW INTERPRET ONLY: CPT

## 2024-01-23 ENCOUNTER — APPOINTMENT (OUTPATIENT)
Dept: INTERNAL MEDICINE | Facility: CLINIC | Age: 59
End: 2024-01-23
Payer: COMMERCIAL

## 2024-01-23 VITALS — DIASTOLIC BLOOD PRESSURE: 84 MMHG | SYSTOLIC BLOOD PRESSURE: 122 MMHG | RESPIRATION RATE: 14 BRPM | HEART RATE: 75 BPM

## 2024-01-23 VITALS — WEIGHT: 232 LBS | BODY MASS INDEX: 34.76 KG/M2

## 2024-01-23 DIAGNOSIS — K21.9 GASTRO-ESOPHAGEAL REFLUX DISEASE W/OUT ESOPHAGITIS: ICD-10-CM

## 2024-01-23 DIAGNOSIS — R73.03 PREDIABETES.: ICD-10-CM

## 2024-01-23 PROCEDURE — G2211 COMPLEX E/M VISIT ADD ON: CPT

## 2024-01-23 PROCEDURE — 99213 OFFICE O/P EST LOW 20 MIN: CPT | Mod: GC

## 2024-01-23 RX ORDER — PANTOPRAZOLE 40 MG/1
40 TABLET, DELAYED RELEASE ORAL DAILY
Qty: 30 | Refills: 1 | Status: ACTIVE | COMMUNITY
Start: 2023-12-12 | End: 1900-01-01

## 2024-01-23 NOTE — END OF VISIT
[] : Resident [FreeTextEntry3] :  58 year old F presenting for follow up. Was started on metformin at last visit for prediabetes, notes nausea and diarrhea after initiating it. Diarrhea has resolved, but she still has nausea and decreased appetite. Well appearing on exam. Weight loss of 8 lbs since starting metformin. Discussed options of holding metformin or transitioning to extended release formulation to reduce GI side effects, pt prefers to continue current regimen for now and reassess symptoms at follow up. RTC 1 month, earlier prn.

## 2024-01-23 NOTE — ASSESSMENT
[FreeTextEntry1] : #Prediabetes Hx of Prediabetes. A1c 5.9 in 12/2023. Started on Metformin 500mg qd IR. Pt endorsing nausea which has been improving and lack of appetite since beginning Metformin. Also had diarrhea initially which has since resolved. Pt with weight loss since last visit -- about 8lbs lost while on Metformin.  - Pt to continue to monitor side effects. If not improved upon follow up within 4 weeks, will consider switching to ER formulation vs discontinuing medication.  #GERD Pt with hx of chronic cough and GERD. Was started on protonix 40mg qd at last office visit and noticed improvement in her symptoms and GERD symptoms.  Pt would like refill sent to pharmacy.  - refill sent to pharmacy  RTC in 4 weeks to discuss medications and side effects

## 2024-01-23 NOTE — HISTORY OF PRESENT ILLNESS
[FreeTextEntry1] : follow up  [de-identified] :  58yo F with PMH of paroxysmal AFib on eliquis, ?TIA, pre-diabetes (A1c 5.9 in 12/2023), palpitations, migraines with aura, GERD and AUB s/p hysteroscopy and D&C who presents for follow up and medication refills. Pt started on protonix 40mg qd for her GERD symptoms one month prior at last visit. Pt was also started on Metformin last month for elevated A1c of 5.9 however she had endorsed extreme nausea and reduction in appetite since taking Metformin. Pt had diarrhea when she began taking it but it has resolved. Side effects have been reducing and patient still wanting to continue on the medication. Pt endorsing her cough has improved since starting Protonix and she feels as though her acid reflux is more controlled. Pt has cardilogy appt next week with Dr. Mckay to discuss her palpitations.

## 2024-01-23 NOTE — REVIEW OF SYSTEMS
[Nausea] : nausea [Fever] : no fever [Chills] : no chills [Night Sweats] : no night sweats [Discharge] : no discharge [Pain] : no pain [Vision Problems] : no vision problems [Itching] : no itching [Chest Pain] : no chest pain [Palpitations] : no palpitations [Orthopnea] : no orthopnea [Shortness Of Breath] : no shortness of breath [Wheezing] : no wheezing [Cough] : no cough [Abdominal Pain] : no abdominal pain [Vomiting] : no vomiting [Heartburn] : no heartburn [Dysuria] : no dysuria [Incontinence] : no incontinence [Hematuria] : no hematuria [Frequency] : no frequency [Joint Pain] : no joint pain [Joint Stiffness] : no joint stiffness [Muscle Pain] : no muscle pain [Back Pain] : no back pain [Headache] : no headache [Dizziness] : no dizziness [Fainting] : no fainting

## 2024-02-19 ENCOUNTER — RX RENEWAL (OUTPATIENT)
Age: 59
End: 2024-02-19

## 2024-02-19 RX ORDER — METOPROLOL SUCCINATE 25 MG/1
25 TABLET, EXTENDED RELEASE ORAL
Qty: 90 | Refills: 1 | Status: ACTIVE | COMMUNITY
Start: 2023-09-26 | End: 1900-01-01

## 2024-02-26 ENCOUNTER — APPOINTMENT (OUTPATIENT)
Dept: INTERNAL MEDICINE | Facility: CLINIC | Age: 59
End: 2024-02-26

## 2024-03-28 ENCOUNTER — APPOINTMENT (OUTPATIENT)
Dept: GYNECOLOGIC ONCOLOGY | Facility: CLINIC | Age: 59
End: 2024-03-28

## 2024-03-28 NOTE — REASON FOR VISIT
[FreeTextEntry1] : Follow-up  56yo here c/o abdominal pain.   LMP:53 , denies any PMB  OBHX:  x 3 GYNHX: denies hx of ovarian cysts/fibroids or abnormal pap smears.   PMHX: denies SX: total knee replacement and breast reduction 8 years ago, D&C 2023  MED: Denies ALL: NKDA  SOCIAL: working as  in [employer], denies any toxic habits FAMHX: denies fmhx of cancer  Health Maintenance Mammogram-2023- 6 month follow up needed- 2023 patient is aware Last pap- 2022- negative, HPV negative  Last colonoscopy - 2021- repeat in 10 years

## 2024-09-30 NOTE — ASSESSMENT
[facility]  [address] [place] [address]  [phone]  [email or web address]       CARDIOLOGY CONSULTATION       Date of  Admission: 9/29/2024 11:27 AM     Admission type:Emergency   Primary Care Physician:Niyah Gardner APRN - NP     Attending Provider: Wicho Echeverria MD  Cardiology Provider: No prior cardiologist    CC/REASON FOR CONSULT: Heart failure     Subjective:     Francesca Baeza is a 70 y.o. female admitted for Acute left-sided CHF (congestive heart failure) (HCC) [I50.1]  Acute congestive heart failure, unspecified heart failure type (HCC) [I50.9].    The patient was seen and examined at the bedside.  She denies any prior medical history other than recently diagnosed low sodium level.  She presented to the hospital because of shortness of breath that worsened over the nighttime.  She is a current smoker.  Patient daughter and patient sister are at the bedside.  The patient has experienced shortness of breath for the last 2 weeks.  Denies any chest pain.  Patient had also experienced leg swelling that has improved since initial presentation to the hospital.      Patient Active Problem List    Diagnosis Date Noted    Acute left-sided CHF (congestive heart failure) (HCC) 09/29/2024    Hypercholesterolemia     Hypertension     Smoker 06/24/2016    Vitamin D deficiency 11/13/2013      Niyah Gardner APRN - NP  Past Medical History:   Diagnosis Date    Hypercholesterolemia     Hypertension       Social History     Socioeconomic History    Marital status:    Tobacco Use    Smoking status: Every Day     Current packs/day: 0.50     Average packs/day: 0.5 packs/day for 44.7 years (22.4 ttl pk-yrs)     Types: Cigarettes     Start date: 1/1/1980     Passive exposure: Current    Smokeless tobacco: Never   Vaping Use    Vaping status: Never Used   Substance and Sexual Activity    Alcohol use: Yes     Alcohol/week: 6.0 standard drinks of alcohol     Types: 6 Cans  [FreeTextEntry1] : -Exam c/w Candida vaginitis extending to buttocks. Will prescribe diflucan and lotrimin\par -Mammogram and breast US given for additional imaging\par -Pelvic US for pelvic cramping\par - Will call patient with results. If everything is normal patient is to follow up in 1 year or prn.  of beer per week     Comment: drinks daiily    Drug use: Never    Sexual activity: Defer     Social Determinants of Health     Financial Resource Strain: Low Risk  (9/4/2024)    Overall Financial Resource Strain (CARDIA)     Difficulty of Paying Living Expenses: Not very hard   Food Insecurity: No Food Insecurity (9/29/2024)    Hunger Vital Sign     Worried About Running Out of Food in the Last Year: Never true     Ran Out of Food in the Last Year: Never true   Transportation Needs: No Transportation Needs (9/29/2024)    PRAPARE - Transportation     Lack of Transportation (Medical): No     Lack of Transportation (Non-Medical): No   Physical Activity: Unknown (3/26/2024)    Exercise Vital Sign     Days of Exercise per Week: 0 days   Recent Concern: Physical Activity - Inactive (3/26/2024)    Exercise Vital Sign     Days of Exercise per Week: 0 days     Minutes of Exercise per Session: 0 min   Stress: No Stress Concern Present (8/30/2023)    [employer] - Occupational Stress Questionnaire     Feeling of Stress : Not at all    Social Connections ([facility] HRSN)   Intimate Partner Violence: Not At Risk (8/30/2023)    Humiliation, Afraid, Rape, and Kick questionnaire     Fear of Current or Ex-Partner: No     Emotionally Abused: No     Physically Abused: No     Sexually Abused: No   Housing Stability: Low Risk  (9/29/2024)    Housing Stability Vital Sign     Unable to Pay for Housing in the Last Year: No     Number of Times Moved in the Last Year: 1     Homeless in the Last Year: No     Allergies   Allergen Reactions    Erythromycin Other (See Comments)      Family History   Problem Relation Age of Onset    Cancer Mother         ORAL,TOBACCO ABUSE    Heart Disease Maternal Grandfather     Alzheimer's Disease Paternal Grandfather       Current Facility-Administered Medications   Medication Dose Route Frequency    sodium chloride flush 0.9 % injection 5-40 mL  5-40 mL IntraVENous 2 times per day          Cervical back: Neck supple.   Skin:     General: Skin is warm and dry.   Neurological:      Mental Status: She is alert.          Data Review:   Recent Labs     09/29/24  1111 09/30/24  0549   WBC 11.4* 8.0   HGB 10.6* 9.3*   HCT 29.9* 26.6*    195     Recent Labs     09/28/24  0415 09/29/24  1111 09/30/24  0549   * 130* 133*   K 4.5 4.6 3.6   CL 97 99 99   CO2 24 24 28   BUN 35* 31* 25*   CREATININE 1.12* 1.16* 0.97   MG 2.0 1.8  --    ALT 46 62  --        Recent Labs     09/28/24  0415 09/28/24  0707 09/29/24  1112   TROPHS 9 14 31         Intake/Output Summary (Last 24 hours) at 9/30/2024 0722  Last data filed at 9/30/2024 0437  Gross per 24 hour   Intake --   Output 2400 ml   Net -2400 ml        Cardiographics    Telemetry: Unremarkable  ECG: Unremarkable    Echocardiogram:  No results found for this or any previous visit.      Stress Test:  No results found for this or any previous visit.       CXRAY: Mild interstitial edema       Assessment:       Principal Problem:    Acute left-sided CHF (congestive heart failure) (HCC)  Resolved Problems:    * No resolved hospital problems. *       Plan:     Fluid status seems to have improved  Decrease the intravenous diuretic dose, administer twice a day  Echocardiogram    Troponin level change from 9-31 (recommend outpatient nuclear cardiac stress test)      Thank you for allowing us to participate in the care of this patient.  We will follow.        Fan De Leon MD  CC:Niyah Gardner, APRN - NP

## 2024-10-08 ENCOUNTER — APPOINTMENT (OUTPATIENT)
Dept: INTERNAL MEDICINE | Facility: CLINIC | Age: 59
End: 2024-10-08

## 2024-10-28 ENCOUNTER — APPOINTMENT (OUTPATIENT)
Dept: INTERNAL MEDICINE | Facility: CLINIC | Age: 59
End: 2024-10-28

## (undated) DEVICE — POSITIONER FOAM EGG CRATE ULNAR 2PCS (PINK)

## (undated) DEVICE — DRAPE IRRIGATION POUCH 19X23"

## (undated) DEVICE — WARMING BLANKET UPPER ADULT

## (undated) DEVICE — DRAPE TOWEL BLUE 17" X 24"

## (undated) DEVICE — SLV COMPRESSION KNEE MED

## (undated) DEVICE — GLV 6.5 PROTEXIS (WHITE)

## (undated) DEVICE — PACK D&C

## (undated) DEVICE — DRSG PAD SANITARY OB

## (undated) DEVICE — POSITIONER STRAP ARMBOARD 1.5X32" DISP

## (undated) DEVICE — TUBING SET GRAVITY 2 SPIKE